# Patient Record
Sex: MALE | Race: WHITE | NOT HISPANIC OR LATINO | URBAN - METROPOLITAN AREA
[De-identification: names, ages, dates, MRNs, and addresses within clinical notes are randomized per-mention and may not be internally consistent; named-entity substitution may affect disease eponyms.]

---
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
## 2024-04-05 ENCOUNTER — TELEPHONE (OUTPATIENT)
Dept: PEDIATRICS | Facility: CLINIC | Age: 19
End: 2024-04-05
Payer: COMMERCIAL

## 2024-04-05 DIAGNOSIS — R06.2 WHEEZING: Primary | ICD-10-CM

## 2024-04-05 RX ORDER — ALBUTEROL SULFATE 0.83 MG/ML
2.5 SOLUTION RESPIRATORY (INHALATION) EVERY 4 HOURS PRN
Qty: 75 ML | Refills: 11 | Status: SHIPPED | OUTPATIENT
Start: 2024-04-05

## 2024-04-20 ENCOUNTER — APPOINTMENT (OUTPATIENT)
Dept: CT IMAGING | Age: 19
End: 2024-04-20
Payer: COMMERCIAL

## 2024-04-20 ENCOUNTER — HOSPITAL ENCOUNTER (EMERGENCY)
Age: 19
Discharge: HOME | End: 2024-04-20
Payer: COMMERCIAL

## 2024-04-20 VITALS
HEIGHT: 69 IN | SYSTOLIC BLOOD PRESSURE: 132 MMHG | TEMPERATURE: 97.8 F | WEIGHT: 160 LBS | BODY MASS INDEX: 23.7 KG/M2 | OXYGEN SATURATION: 100 % | DIASTOLIC BLOOD PRESSURE: 79 MMHG | RESPIRATION RATE: 16 BRPM | HEART RATE: 57 BPM

## 2024-04-20 DIAGNOSIS — S09.90XA INJURY OF HEAD, INITIAL ENCOUNTER: Primary | ICD-10-CM

## 2024-04-20 DIAGNOSIS — S01.81XA FACIAL LACERATION, INITIAL ENCOUNTER: ICD-10-CM

## 2024-04-20 PROCEDURE — 99284 EMERGENCY DEPT VISIT MOD MDM: CPT

## 2024-04-20 PROCEDURE — 90715 TDAP VACCINE 7 YRS/> IM: CPT | Performed by: PHYSICIAN ASSISTANT

## 2024-04-20 PROCEDURE — 6370000000 HC RX 637 (ALT 250 FOR IP): Performed by: PHYSICIAN ASSISTANT

## 2024-04-20 PROCEDURE — 6360000002 HC RX W HCPCS: Performed by: PHYSICIAN ASSISTANT

## 2024-04-20 PROCEDURE — 12011 RPR F/E/E/N/L/M 2.5 CM/<: CPT

## 2024-04-20 PROCEDURE — 70450 CT HEAD/BRAIN W/O DYE: CPT

## 2024-04-20 PROCEDURE — 90471 IMMUNIZATION ADMIN: CPT | Performed by: PHYSICIAN ASSISTANT

## 2024-04-20 RX ORDER — IBUPROFEN 600 MG/1
600 TABLET ORAL ONCE
Status: COMPLETED | OUTPATIENT
Start: 2024-04-20 | End: 2024-04-20

## 2024-04-20 RX ADMIN — TETANUS TOXOID, REDUCED DIPHTHERIA TOXOID AND ACELLULAR PERTUSSIS VACCINE, ADSORBED 0.5 ML: 5; 2.5; 8; 8; 2.5 SUSPENSION INTRAMUSCULAR at 22:07

## 2024-04-20 RX ADMIN — IBUPROFEN 600 MG: 600 TABLET, FILM COATED ORAL at 22:07

## 2024-04-20 ASSESSMENT — PAIN SCALES - GENERAL: PAINLEVEL_OUTOF10: 9

## 2024-04-20 ASSESSMENT — PAIN DESCRIPTION - LOCATION: LOCATION: HEAD

## 2024-04-20 ASSESSMENT — PAIN - FUNCTIONAL ASSESSMENT: PAIN_FUNCTIONAL_ASSESSMENT: 0-10

## 2024-04-21 NOTE — ED NOTES
Pt reports he fell down and hit his head at a party. Denies LoC, denies blurred vision. Rates pain 9/10. States he feels light headed.

## 2024-04-21 NOTE — ED PROVIDER NOTES
Triage Chief Complaint:   Head Injury (Fell off rail striking head on concrete, lac above left eye, no loc)    [place]:  Giovany Walters is a 18 y.o. male that presents today for laceration.  Context is pt has been drinking alcohol. Stumbled and fell hitting head on ground causing laceration to the scalp.   Pain is ranked  06/10  Onset was just prior to arrival  No gross blood loss.  No loss of consciousness no confusion or dizziness no lightheadedness no headache.        ROS:  At least  06 systems reviewed and otherwise negative except as in the [place].    History reviewed. No pertinent past medical history.  History reviewed. No pertinent surgical history.  History reviewed. No pertinent family history.  Social History     Socioeconomic History    Marital status: Single     Spouse name: Not on file    Number of children: Not on file    Years of education: Not on file    Highest education level: Not on file   Occupational History    Not on file   Tobacco Use    Smoking status: Not on file    Smokeless tobacco: Not on file   Substance and Sexual Activity    Alcohol use: Not on file    Drug use: Not on file    Sexual activity: Not on file   Other Topics Concern    Not on file   Social History Narrative    Not on file     Social Determinants of Health     Financial Resource Strain: Not on file   Food Insecurity: Not on file   Transportation Needs: Not on file   Physical Activity: Not on file   Stress: Not on file   Social Connections: Not on file   Intimate Partner Violence: Not on file   Housing Stability: Not on file     Current Facility-Administered Medications   Medication Dose Route Frequency Provider Last Rate Last Admin    lidocaine-EPINEPHrine-tetracaine (LET) topical gel 3 mL syringe  3 mL Topical Once Satish Ivory PA-C         No current outpatient medications on file.     No Known Allergies    Nursing Notes Reviewed    Physical Exam:  ED Triage Vitals   Enc Vitals Group      BP 04/20/24 2143 128/75

## 2024-04-23 ENCOUNTER — TELEPHONE (OUTPATIENT)
Dept: PEDIATRICS | Facility: CLINIC | Age: 19
End: 2024-04-23
Payer: COMMERCIAL

## 2024-04-23 NOTE — TELEPHONE ENCOUNTER
Mom dainDarren was seen at [facility] 4/20 after falling off a railing and hitting head on concrete. Received 7 stitches above his left eye. They are dissolvable stitches.  Today, he is complaining of a headache and nausea. Denies vomiting, confusion, light and sound sensitivity.   Mom asking, what do you recommend for follow up?

## 2025-01-17 ENCOUNTER — OFFICE VISIT (OUTPATIENT)
Dept: PEDIATRICS | Facility: CLINIC | Age: 20
End: 2025-01-17
Payer: COMMERCIAL

## 2025-01-17 VITALS
DIASTOLIC BLOOD PRESSURE: 70 MMHG | SYSTOLIC BLOOD PRESSURE: 128 MMHG | TEMPERATURE: 98.4 F | BODY MASS INDEX: 23.04 KG/M2 | WEIGHT: 156 LBS

## 2025-01-17 DIAGNOSIS — R05.1 ACUTE COUGH: ICD-10-CM

## 2025-01-17 DIAGNOSIS — R52 ACHES: ICD-10-CM

## 2025-01-17 DIAGNOSIS — R50.81 FEVER IN OTHER DISEASES: ICD-10-CM

## 2025-01-17 DIAGNOSIS — J02.9 SORE THROAT: Primary | ICD-10-CM

## 2025-01-17 LAB — POC RAPID STREP: NEGATIVE

## 2025-01-17 PROCEDURE — 99213 OFFICE O/P EST LOW 20 MIN: CPT | Performed by: PEDIATRICS

## 2025-01-17 PROCEDURE — 87880 STREP A ASSAY W/OPTIC: CPT | Performed by: PEDIATRICS

## 2025-01-17 PROCEDURE — 87651 STREP A DNA AMP PROBE: CPT

## 2025-01-17 ASSESSMENT — ENCOUNTER SYMPTOMS
COUGH: 1
FEVER: 1
SORE THROAT: 1

## 2025-01-17 NOTE — PROGRESS NOTES
Subjective   Patient ID: Darren Abebe is a 19 y.o. male who presents for Cough, Fever, and Sore Throat.  3-4 days of sore throat, body aches, headache, fever up to 101 which appeared suddenly.  He coughs up dark phlegm.    Cough  Associated symptoms include a fever and a sore throat.   Fever   Associated symptoms include coughing and a sore throat.   Sore Throat   Associated symptoms include coughing.     Review of Systems   Constitutional:  Positive for fever.   HENT:  Positive for sore throat.    Respiratory:  Positive for cough.      Objective   Visit Vitals  /70 (BP Location: Right arm, Patient Position: Sitting)   Temp 36.9 °C (98.4 °F) (Oral)      Physical Exam  Constitutional:       Appearance: Normal appearance. He is normal weight.   HENT:      Head: Normocephalic and atraumatic.      Right Ear: Tympanic membrane and ear canal normal.      Left Ear: Tympanic membrane and ear canal normal.      Nose: Nose normal.      Mouth/Throat:      Mouth: Mucous membranes are moist.      Pharynx: Oropharynx is clear. Posterior oropharyngeal erythema present. No oropharyngeal exudate.   Eyes:      Extraocular Movements: Extraocular movements intact.      Conjunctiva/sclera: Conjunctivae normal.   Cardiovascular:      Rate and Rhythm: Normal rate and regular rhythm.   Pulmonary:      Effort: Pulmonary effort is normal. No respiratory distress.      Breath sounds: Normal breath sounds. No wheezing or rales.   Musculoskeletal:      Cervical back: Normal range of motion and neck supple.   Skin:     General: Skin is warm.   Neurological:      Mental Status: He is alert.       Darren was seen today for cough, fever and sore throat.  Diagnoses and all orders for this visit:  Sore throat (Primary)  -     POCT rapid strep A manually resulted  -     Group A Streptococcus, PCR  Aches  Acute cough  Fever in other diseases  Could be resolving influenza, R/O strep    Dex Rahman MD  [facility]  [id number], [address]  [place], [place] [address] [phone]

## 2025-01-18 LAB — S PYO DNA THROAT QL NAA+PROBE: NOT DETECTED

## 2025-05-24 ENCOUNTER — OFFICE VISIT (OUTPATIENT)
Dept: URGENT CARE | Age: 20
End: 2025-05-24
Payer: COMMERCIAL

## 2025-05-24 VITALS
DIASTOLIC BLOOD PRESSURE: 77 MMHG | OXYGEN SATURATION: 97 % | BODY MASS INDEX: 23.7 KG/M2 | HEART RATE: 92 BPM | HEIGHT: 69 IN | RESPIRATION RATE: 18 BRPM | SYSTOLIC BLOOD PRESSURE: 155 MMHG | WEIGHT: 160 LBS | TEMPERATURE: 96.8 F

## 2025-05-24 DIAGNOSIS — S61.411A LACERATION OF RIGHT HAND WITHOUT FOREIGN BODY, INITIAL ENCOUNTER: Primary | ICD-10-CM

## 2025-05-24 ASSESSMENT — PAIN SCALES - GENERAL: PAINLEVEL_OUTOF10: 0-NO PAIN

## 2025-05-24 NOTE — PROGRESS NOTES
"Subjective   Patient ID: Darren Abebe is a 19 y.o. male. They present today with a chief complaint of Injury (Cut on right hand. ).    History of Present Illness  Patient is a very pleasant 19-year-old white male, no significant past medical history, presented to clinic with complaint of laceration.  Patient states he was going around with his brother prior to arrival when he smacked the back of his hand against the chandelier causing a small laceration of the dorsal aspect of his right hand.  States his tetanus is up-to-date.  He denies any other injuries.  Report of the clinic out of concern for suture repair.  No further complaints at this time.      Injury      Past Medical History  Allergies as of 05/24/2025 - Reviewed 05/24/2025   Allergen Reaction Noted    Peanut Hives and Unknown 08/10/2010    Budesonide Unknown 08/09/2010       Prescriptions Prior to Admission[1]       Medical History[2]    Surgical History[3]     reports that he has never smoked. He has never used smokeless tobacco. He reports that he does not drink alcohol and does not use drugs.    Review of Systems  Review of Systems                               Objective    Vitals:    05/24/25 1604   BP: 155/77   Pulse: 92   Resp: 18   Temp: 36 °C (96.8 °F)   SpO2: 97%   Weight: 72.6 kg (160 lb)   Height: 1.753 m (5' 9\")     No LMP for male patient.    Physical Exam  General: Vitals Noted. No distress. Normocephalic.     HEENT: TMs normal, EOMI, normal conjunctiva, patent nares, Normal OP    Neck: Supple with no adenopathy.     Cardiac: Regular Rate and Rhythm. No murmur.     Pulmonary: Equal breath sounds bilaterally. No wheezes, rhonchi, or rales.    Abdomen: Soft, non-tender, with normal bowel sounds.     Musculoskeletal: Moves all extremities, no effusion, no edema.     Skin: There is an approximately 3 cm deep laceration over the dorsal aspect of the right hand just proximal to the third MCP joint.  There is no involvement of underlying " tendons or structures.  He has full strength of flexion extension of all digits specifically at the MCPs.  Is neurovascular tact distally with cap refill less than 2 seconds and full  strength.  No obvious rashes.    Laceration Repair    Date/Time: 5/24/2025 4:24 PM    Performed by: Edwin Segundo PA-C  Authorized by: Edwin Segundo PA-C    Consent:     Consent obtained:  Verbal    Consent given by:  Patient    Risks, benefits, and alternatives were discussed: yes      Risks discussed:  Infection, pain and poor cosmetic result  Universal protocol:     Procedure explained and questions answered to patient or proxy's satisfaction: yes      Patient identity confirmed:  Verbally with patient  Anesthesia:     Anesthesia method:  Local infiltration    Local anesthetic:  Lidocaine 1% WITH epi  Laceration details:     Location:  Hand    Hand location:  R hand, dorsum    Length (cm):  3  Pre-procedure details:     Preparation:  Patient was prepped and draped in usual sterile fashion  Treatment:     Area cleansed with:  Povidone-iodine and chlorhexidine    Amount of cleaning:  Standard  Skin repair:     Repair method:  Sutures    Suture size:  4-0    Suture material:  Nylon    Suture technique:  Simple interrupted    Number of sutures:  3  Approximation:     Approximation:  Close  Repair type:     Repair type:  Simple  Post-procedure details:     Dressing:  Antibiotic ointment and bulky dressing    Procedure completion:  Tolerated well, no immediate complications      Point of Care Test & Imaging Results from this visit    Imaging  No results found.    Cardiology, Vascular, and Other Imaging  No other imaging results found for the past 2 days      Diagnostic study results (if any) were reviewed by Edwin Segundo PA-C.    Assessment/Plan   Allergies, medications, history, and pertinent labs/EKGs/Imaging reviewed by Edwin Segundo PA-C.     Medical Decision Making  Patient was seen by the clinic for  complaint of right hand laceration.  On exam patient is nontoxic very well-appearing speculatively no acute distress.  Vital signs are stable, afebrile.  Chest clear, heart is regular, belly soft and nontender peer evaluation of the dorsal right hand as above.  Wound was copiously cleansed with Hibiclens and sterile water.  Laceration was repaired per procedure note above.  Bacitracin and bulky dressing applied.  Advised to return to clinic in 7 to 10 days for suture removal.  I did review wound care instructions with the patient.  As noted tetanus up-to-date.  Will be discharged home at this time.  Reviewed my impression, plan, strict return versus report to ED precautions with the patient.  He expresses understanding and agreement plan of care.    Orders and Diagnoses  There are no diagnoses linked to this encounter.      Medical Admin Record      Follow Up Instructions  No follow-ups on file.    Patient disposition: Home    Electronically signed by Edwin Segundo PA-C  4:24 PM         [1] (Not in a hospital admission)  [2]   Past Medical History:  Diagnosis Date    Acute maxillary sinusitis, unspecified 10/16/2017    Acute non-recurrent maxillary sinusitis    Acute streptococcal tonsillitis, unspecified 12/30/2014    Streptococcal tonsillitis    Local infection of the skin and subcutaneous tissue, unspecified 07/15/2015    Pustule    Nondisplaced fracture of proximal phalanx of right little finger, initial encounter for closed fracture 02/03/2018    Closed nondisplaced fracture of proximal phalanx of right little finger, initial encounter    Other conditions influencing health status 10/16/2017    History of cough    Pain in left knee 01/18/2017    Acute pain of left knee    Personal history of other diseases of the respiratory system 10/16/2017    History of acute bronchitis    Personal history of other specified conditions 08/28/2018    History of nasal congestion    Rash and other nonspecific skin  eruption 09/19/2013    Rash    Unspecified injury of right wrist, hand and finger(s), initial encounter 02/03/2018    Injury of right little finger, initial encounter   [3] No past surgical history on file.

## 2025-05-24 NOTE — LETTER
May 24, 2025     Patient: Darren Abebe   YOB: 2005   Date of Visit: 5/24/2025       To Whom It May Concern:    It is my medical opinion that Darren Abebe should remain out of work until sutures are removed; approximately 7-10 days.    If you have any questions or concerns, please don't hesitate to call.         Sincerely,        Edwin Segundo PA-C    CC: No Recipients

## 2025-05-31 ENCOUNTER — OFFICE VISIT (OUTPATIENT)
Dept: URGENT CARE | Age: 20
End: 2025-05-31
Payer: COMMERCIAL

## 2025-05-31 DIAGNOSIS — Z48.02 VISIT FOR SUTURE REMOVAL: Primary | ICD-10-CM

## 2025-05-31 ASSESSMENT — ENCOUNTER SYMPTOMS
FEVER: 0
CHILLS: 0
WOUND: 1

## 2025-05-31 NOTE — PROGRESS NOTES
Subjective   Patient ID: Darren Abebe is a 19 y.o. male. They present today with a chief complaint of Suture / Staple Removal (Stitches put in last Saturday and needs them checked and possibly removed).    History of Present Illness  Patient is a 19-year-old male with sutures to the right hand.  Patient states he has had them for 7 days.  He has 3 sutures and they are removed      Suture / Staple Removal         Past Medical History  Allergies as of 05/31/2025 - Reviewed 05/31/2025   Allergen Reaction Noted    Peanut Hives and Unknown 08/10/2010    Budesonide Unknown 08/09/2010       Prescriptions Prior to Admission[1]     Medical History[2]    Surgical History[3]     reports that he has never smoked. He has never used smokeless tobacco. He reports that he does not drink alcohol and does not use drugs.    Review of Systems  Review of Systems   Constitutional:  Negative for chills and fever.   Skin:  Positive for wound.   All other systems reviewed and are negative.                                 Objective    There were no vitals filed for this visit.  No LMP for male patient.    Physical Exam  Vitals reviewed.         Suture Removal    Date/Time: 5/31/2025 12:31 PM    Performed by: Yuko Reese MD  Authorized by: Yuko Reese MD    Consent:     Consent obtained:  Verbal    Consent given by:  Patient    Risks discussed:  Wound separation  Comments:      Patient had 3 sutures removed.      Point of Care Test & Imaging Results from this visit  No results found for this visit on 05/31/25.   Imaging  No results found.  Okay  Cardiology, Vascular, and Other Imaging  No other imaging results found for the past 2 days      Diagnostic study results (if any) were reviewed by Yuko Reese MD.    Assessment/Plan   Allergies, medications, history, and pertinent labs/EKGs/Imaging reviewed by Yuko Reese MD.     Medical Decision Making  Patient had 3 sutures removed.    Orders and Diagnoses  There are no  diagnoses linked to this encounter.    Medical Admin Record      Patient disposition: Home    Electronically signed by Yuko Reese MD  12:29 PM           [1] (Not in a hospital admission)  [2]   Past Medical History:  Diagnosis Date    Acute maxillary sinusitis, unspecified 10/16/2017    Acute non-recurrent maxillary sinusitis    Acute streptococcal tonsillitis, unspecified 12/30/2014    Streptococcal tonsillitis    Local infection of the skin and subcutaneous tissue, unspecified 07/15/2015    Pustule    Nondisplaced fracture of proximal phalanx of right little finger, initial encounter for closed fracture 02/03/2018    Closed nondisplaced fracture of proximal phalanx of right little finger, initial encounter    Other conditions influencing health status 10/16/2017    History of cough    Pain in left knee 01/18/2017    Acute pain of left knee    Personal history of other diseases of the respiratory system 10/16/2017    History of acute bronchitis    Personal history of other specified conditions 08/28/2018    History of nasal congestion    Rash and other nonspecific skin eruption 09/19/2013    Rash    Unspecified injury of right wrist, hand and finger(s), initial encounter 02/03/2018    Injury of right little finger, initial encounter   [3] No past surgical history on file.

## 2025-06-04 ENCOUNTER — OFFICE VISIT (OUTPATIENT)
Dept: URGENT CARE | Age: 20
End: 2025-06-04
Payer: COMMERCIAL

## 2025-06-04 VITALS
RESPIRATION RATE: 17 BRPM | DIASTOLIC BLOOD PRESSURE: 80 MMHG | TEMPERATURE: 98.3 F | WEIGHT: 160 LBS | HEART RATE: 65 BPM | SYSTOLIC BLOOD PRESSURE: 135 MMHG | BODY MASS INDEX: 23.7 KG/M2 | HEIGHT: 69 IN | OXYGEN SATURATION: 100 %

## 2025-06-04 DIAGNOSIS — L08.9 SOFT TISSUE INFECTION: Primary | ICD-10-CM

## 2025-06-04 PROCEDURE — 3008F BODY MASS INDEX DOCD: CPT | Performed by: NURSE PRACTITIONER

## 2025-06-04 PROCEDURE — 1036F TOBACCO NON-USER: CPT | Performed by: NURSE PRACTITIONER

## 2025-06-04 PROCEDURE — 99213 OFFICE O/P EST LOW 20 MIN: CPT | Performed by: NURSE PRACTITIONER

## 2025-06-04 RX ORDER — MUPIROCIN 20 MG/G
OINTMENT TOPICAL 2 TIMES DAILY
Qty: 22 G | Refills: 0 | Status: SHIPPED | OUTPATIENT
Start: 2025-06-04 | End: 2025-06-14

## 2025-06-04 RX ORDER — DOXYCYCLINE 100 MG/1
100 CAPSULE ORAL 2 TIMES DAILY
Qty: 20 CAPSULE | Refills: 0 | Status: SHIPPED | OUTPATIENT
Start: 2025-06-04 | End: 2025-06-14

## 2025-06-04 ASSESSMENT — ENCOUNTER SYMPTOMS: WOUND: 1

## 2025-06-04 NOTE — PATIENT INSTRUCTIONS
Doxycycline 1 tablet 2 times a day for 10 days.  Wash the site with mild soap and water 2 times a day.  Dry.  Apply thin layer of mupirocin ointment.  If you notice the area becomes more swollen or you notice redness streaking up the arm please go to local emergency department for further evaluation.  Please call your primary care doctor next 5 to 7 days.    Please follow up with your primary provider within one week if symptoms do not improve.  You may schedule an appointment online at [email or web address] or call [phone]. Go to the Emergency Department if symptoms significantly worsen or if you develop chest pain or shortness of breath.

## 2025-06-04 NOTE — PROGRESS NOTES
"Subjective   Patient ID: Darren Abebe is a 19 y.o. male. They present today with a chief complaint of Hand Pain (Pain and swelling of right hand after stiches removal on May 31st).    History of Present Illness  Darren Abebe is a 19 y.o. male who presents to the clinic for right third digit MCP joint edema.  Patient states he had sutures removed on May 31.  [place].  Patient noticed last night and today the incision is swollen with purulent discharge coming out. Pt denies any chest pain, sob, N/V at this time in clinic.             Past Medical History  Allergies as of 06/04/2025 - Reviewed 06/04/2025   Allergen Reaction Noted    Peanut Hives and Unknown 08/10/2010    Budesonide Unknown 08/09/2010       Prescriptions Prior to Admission[1]     Medical History[2]    Surgical History[3]     reports that he has never smoked. He has never used smokeless tobacco. He reports that he does not drink alcohol and does not use drugs.    Review of Systems  Review of Systems   Skin:  Positive for wound.   All other systems reviewed and are negative.                                 Objective    Vitals:    06/04/25 1122   BP: 135/80   Pulse: 65   Resp: 17   Temp: 36.8 °C (98.3 °F)   SpO2: 100%   Weight: 72.6 kg (160 lb)   Height: 1.753 m (5' 9\")     No LMP for male patient.    Physical Exam  Constitutional:       Appearance: Normal appearance.   Musculoskeletal:      Right elbow: Normal.      Right wrist: Normal.      Right hand: Swelling and tenderness present.        Arms:       Comments: Laceration noted to the right third digit MCP joint.  Corner of the incision-purulent discharge noted.  Edema and erythema noted around incision.  Distal right hand sensation intact.  Cap refill less than 2 seconds.  Range of motion of hand intact.   Neurological:      General: No focal deficit present.      Mental Status: He is alert and oriented to person, place, and time. " Mental status is at baseline.   Psychiatric:         Mood and Affect: Mood normal.         Behavior: Behavior normal.         Procedures    Point of Care Test & Imaging Results from this visit  No results found for this visit on 06/04/25.   Imaging  No results found.    Cardiology, Vascular, and Other Imaging  No other imaging results found for the past 2 days      Diagnostic study results (if any) were reviewed by IRIS Swain.    Assessment/Plan   Allergies, medications, history, and pertinent labs/EKGs/Imaging reviewed by IRIS Swain.     Medical Decision Making  Advised patient to wash the area mild soap and water 2 times a day dry thoroughly apply mupirocin ointment.  Doxycycline ordered.  Advised patient to monitor swelling and if noting red streak up the arm to go to local emergency department for further evaluation.  Patient verbalized understanding.      Orders and Diagnoses  Diagnoses and all orders for this visit:  Soft tissue infection  -     doxycycline (Vibramycin) 100 mg capsule; Take 1 capsule (100 mg) by mouth 2 times a day for 10 days. Take with at least 8 ounces (large glass) of water, do not lie down for 30 minutes after  -     mupirocin (Bactroban) 2 % ointment; Apply topically 2 times a day for 10 days.      Medical Admin Record      Patient disposition: Home    Electronically signed by IRIS Swain  12:28 PM           [1] (Not in a hospital admission)   [2]   Past Medical History:  Diagnosis Date    Acute maxillary sinusitis, unspecified 10/16/2017    Acute non-recurrent maxillary sinusitis    Acute streptococcal tonsillitis, unspecified 12/30/2014    Streptococcal tonsillitis    Local infection of the skin and subcutaneous tissue, unspecified 07/15/2015    Pustule    Nondisplaced fracture of proximal phalanx of right little finger, initial encounter for closed fracture 02/03/2018    Closed nondisplaced fracture of proximal phalanx of right little finger,  initial encounter    Other conditions influencing health status 10/16/2017    History of cough    Pain in left knee 01/18/2017    Acute pain of left knee    Personal history of other diseases of the respiratory system 10/16/2017    History of acute bronchitis    Personal history of other specified conditions 08/28/2018    History of nasal congestion    Rash and other nonspecific skin eruption 09/19/2013    Rash    Unspecified injury of right wrist, hand and finger(s), initial encounter 02/03/2018    Injury of right little finger, initial encounter   [3] No past surgical history on file.     Estimated Blood Loss (Cc): minimal